# Patient Record
Sex: MALE | Race: WHITE
[De-identification: names, ages, dates, MRNs, and addresses within clinical notes are randomized per-mention and may not be internally consistent; named-entity substitution may affect disease eponyms.]

---

## 2020-02-04 ENCOUNTER — HOSPITAL ENCOUNTER (OUTPATIENT)
Dept: HOSPITAL 61 - US | Age: 75
Discharge: HOME | End: 2020-02-04
Attending: PODIATRIST
Payer: MEDICARE

## 2020-02-04 DIAGNOSIS — M79.672: Primary | ICD-10-CM

## 2020-02-04 PROCEDURE — 93925 LOWER EXTREMITY STUDY: CPT

## 2020-02-05 NOTE — RAD
DUPLEX LOWER EXTREMITY BILAT

 

Indication: Nonpalpable pedal pulses. Left foot pain. 

 

Comparison: None.

 

Procedure: Real-time grayscale, color flow Doppler, and Doppler spectral 

waveform analysis of the arterial system of the lower extremity is 

performed.

 

Findings: 

Right lower extremity: Monophasic waveform within the right posterior 

tibial, peroneal and dorsalis pedis arteries. Biphasic waveforms 

throughout the remainder right lower extremity arterial system. No 

velocity elevation.

 

Left lower extremity: Monophasic waveform within the left posterior tibial

and dorsalis pedis arteries. Left peroneal artery not identified. Biphasic

waveforms throughout the remainder left lower extremity arterial system. 

Elevated velocity within the left anterior tibial artery measures 276 

cm/s.

 

IMPRESSION:

1.  Elevated velocity within the left anterior tibial artery, indicating 

50-75 percent stenosis.

2.  Left peroneal artery not identified, may relate to slow flow or 

occlusion.

3.  Monophasic waveform within the right posterior tibial, peroneal and 

dorsalis pedis arteries as well as the left posterior tibial and dorsalis 

pedis arteries, may indicate proximal stenosis.

 

Electronically signed by: Simón Howard DO (2/5/2020 1:22 PM) [id number]